# Patient Record
(demographics unavailable — no encounter records)

---

## 2024-10-10 NOTE — DISCUSSION/SUMMARY
[FreeTextEntry1] : Tylenol, fluids rest.  Return if fever persists Increase Miralax up to a capful QD if needed. Prune or pear juice, increase water/fiber.

## 2024-10-10 NOTE — HISTORY OF PRESENT ILLNESS
[de-identified] : fevers on and off X 5 days, coughing, no nasal congestion, also constipated last BM X 1 week ago, mom has tried miralax, benefiber, gave pt an enema this morning also gave her coffee, c/o stomach pain [FreeTextEntry6] : Fever on and off x 4 days , none today.  Slight cough and congestion, no ST or ear pain, no vomiting. She has chronic constipation which has worsened this week.  They are uncertain the last time she had a BM. She  gets a small amount of daily Miralax. They tried an enema yesterday without success.

## 2025-01-15 NOTE — HISTORY OF PRESENT ILLNESS
[de-identified] : fever X 3 days, cough, nasal congestio, no N/V/D/C seen at [facility] 01/15/2025 had T/C done rapid was negative lab T/C still pending [FreeTextEntry6] : ST ,nasal congestion fever x 3 days.  Seen at  2 days ago and rapid strep was negative with cx pending.  Has an occasional HA and minimal cough. No abd pain or vomiting. No known sick contacts.

## 2025-01-15 NOTE — REVIEW OF SYSTEMS
[Fever] : fever [Headache] : headache [Ear Pain] : no ear pain [Nasal Congestion] : nasal congestion [Sore Throat] : sore throat [Cough] : cough [Shortness of Breath] : no shortness of breath [Negative] : Skin

## 2025-02-21 NOTE — DISCUSSION/SUMMARY
[] : The components of the vaccine(s) to be administered today are listed in the plan of care. The disease(s) for which the vaccine(s) are intended to prevent and the risks have been discussed with the caretaker.  The risks are also included in the appropriate vaccination information statements which have been provided to the patient's caregiver.  The caregiver has given consent to vaccinate. [FreeTextEntry1] : D/W pt well visit, reviewed nutrition/exercise, encourage safety- bike/ski helmet, water safety, sunblock, booster seat until 57in tall and at least 8-12yrs of age and then transition to seatbelt in back seat, sunblock, water safety. Avoid alcohol/drug/tobacco use; advise routine dental care; reviewed puberty, reviewed and consented for vaccinations today. parent declined flu vaccine D/W parent c/o body odor- reviewed gentle antiperspirant; no signs of puberty on exam, continue to monitor.

## 2025-02-21 NOTE — PHYSICAL EXAM
[Alert] : alert [No Acute Distress] : no acute distress [Normocephalic] : normocephalic [Conjunctivae with no discharge] : conjunctivae with no discharge [PERRL] : PERRL [EOMI Bilateral] : EOMI bilateral [Auricles Well Formed] : auricles well formed [Clear Tympanic membranes with present light reflex and bony landmarks] : clear tympanic membranes with present light reflex and bony landmarks [No Discharge] : no discharge [Nares Patent] : nares patent [Pink Nasal Mucosa] : pink nasal mucosa [Palate Intact] : palate intact [Nonerythematous Oropharynx] : nonerythematous oropharynx [Supple, full passive range of motion] : supple, full passive range of motion [No Palpable Masses] : no palpable masses [Symmetric Chest Rise] : symmetric chest rise [Clear to Auscultation Bilaterally] : clear to auscultation bilaterally [Regular Rate and Rhythm] : regular rate and rhythm [Normal S1, S2 present] : normal S1, S2 present [No Murmurs] : no murmurs [+2 Femoral Pulses] : +2 femoral pulses [Soft] : soft [NonTender] : non tender [Non Distended] : non distended [Normoactive Bowel Sounds] : normoactive bowel sounds [No Hepatomegaly] : no hepatomegaly [No Splenomegaly] : no splenomegaly [Patent] : patent [No fissures] : no fissures [No Abnormal Lymph Nodes Palpated] : no abnormal lymph nodes palpated [No Gait Asymmetry] : no gait asymmetry [No pain or deformities with palpation of bone, muscles, joints] : no pain or deformities with palpation of bone, muscles, joints [Normal Muscle Tone] : normal muscle tone [Straight] : straight [+2 Patella DTR] : +2 patella DTR [Cranial Nerves Grossly Intact] : cranial nerves grossly intact [No Rash or Lesions] : no rash or lesions [Raz: ____] : Raz [unfilled] [Raz: _____] : Raz [unfilled]

## 2025-02-21 NOTE — HISTORY OF PRESENT ILLNESS
[Fruit] : fruit [Vegetables] : vegetables [Meat] : meat [Grains] : grains [Normal] : Normal [Brushing teeth twice/d] : brushing teeth twice per day [Yes] : Patient goes to dentist yearly [Vitamin] : Primary Fluoride Source: Vitamin [Adequate performance] : adequate performance [No] : No cigarette smoke exposure [Appropriately restrained in motor vehicle] : appropriately restrained in motor vehicle [Wears helmet and pads] : wears helmet and pads [Monitored computer use] : monitored computer use [FreeTextEntry7] : 7yr old f here for a physical [FreeTextEntry8] : MiraLAX for constipation [FreeTextEntry1] : 1st grade- cheerleading Parent c/o body odor after sports/cheer pt states she has difficulty hearing teacher at school if classroom is noisy- parent has no hearing concerns, pt passed hearing test in office.